# Patient Record
Sex: MALE
[De-identification: names, ages, dates, MRNs, and addresses within clinical notes are randomized per-mention and may not be internally consistent; named-entity substitution may affect disease eponyms.]

---

## 2019-02-13 ENCOUNTER — HOSPITAL ENCOUNTER (EMERGENCY)
Dept: HOSPITAL 97 - ER | Age: 34
Discharge: HOME | End: 2019-02-13
Payer: COMMERCIAL

## 2019-02-13 DIAGNOSIS — R00.0: ICD-10-CM

## 2019-02-13 DIAGNOSIS — I10: Primary | ICD-10-CM

## 2019-02-13 DIAGNOSIS — Z79.899: ICD-10-CM

## 2019-02-13 LAB
BUN BLD-MCNC: 10 MG/DL (ref 7–18)
GLUCOSE SERPLBLD-MCNC: 85 MG/DL (ref 74–106)
HCT VFR BLD CALC: 46.5 % (ref 39.6–49)
LYMPHOCYTES # SPEC AUTO: 1.1 K/UL (ref 0.7–4.9)
PMV BLD: 8.8 FL (ref 7.6–11.3)
POTASSIUM SERPL-SCNC: 4.2 MMOL/L (ref 3.5–5.1)
RBC # BLD: 5.37 M/UL (ref 4.33–5.43)
TROPONIN (EMERG DEPT USE ONLY): < 0.02 NG/ML (ref 0–0.04)
TSH SERPL DL<=0.05 MIU/L-ACNC: 1.01 UIU/ML (ref 0.36–3.74)

## 2019-02-13 PROCEDURE — 87804 INFLUENZA ASSAY W/OPTIC: CPT

## 2019-02-13 PROCEDURE — 96360 HYDRATION IV INFUSION INIT: CPT

## 2019-02-13 PROCEDURE — 80048 BASIC METABOLIC PNL TOTAL CA: CPT

## 2019-02-13 PROCEDURE — 36415 COLL VENOUS BLD VENIPUNCTURE: CPT

## 2019-02-13 PROCEDURE — 93005 ELECTROCARDIOGRAM TRACING: CPT

## 2019-02-13 PROCEDURE — 84484 ASSAY OF TROPONIN QUANT: CPT

## 2019-02-13 PROCEDURE — 85025 COMPLETE CBC W/AUTO DIFF WBC: CPT

## 2019-02-13 PROCEDURE — 82550 ASSAY OF CK (CPK): CPT

## 2019-02-13 PROCEDURE — 96361 HYDRATE IV INFUSION ADD-ON: CPT

## 2019-02-13 PROCEDURE — 71046 X-RAY EXAM CHEST 2 VIEWS: CPT

## 2019-02-13 PROCEDURE — 84443 ASSAY THYROID STIM HORMONE: CPT

## 2019-02-13 PROCEDURE — 99285 EMERGENCY DEPT VISIT HI MDM: CPT

## 2019-02-13 NOTE — ER
Nurse's Notes                                                                                     

 [facility]                                                                

Name: Feroz Mcdaniel                                                                               

Age: 34 yrs                                                                                       

Sex: Male                                                                                         

: 1985                                                                                   

MRN: [id number]                                                                                   

Arrival Date: 2019                                                                          

Time: 12:46                                                                                       

Account#: [id number]                                                                            

Bed 15                                                                                            

Private MD: None, None                                                                            

Diagnosis: Tachycardia, unspecified;Essential (primary) hypertension                              

                                                                                                  

Presentation:                                                                                     

                                                                                             

12:50 Presenting complaint: Productive cough with green sputum and headache x 1 week, N/V     hb  

      today. Pt reports home /111, . Transition of care: patient was not received     

      from another setting of care. Onset of symptoms was 2019. Risk Assessment:     

      Do you want to hurt yourself or someone else? Patient reports no desire to harm self or     

      others. Care prior to arrival: None.                                                        

12:50 Method Of Arrival: Ambulatory                                                           hb  

12:50 Acuity: YAMILETH 3                                                                           hb  

14:00 Initial Sepsis Screen: Does the patient meet any 2 criteria? No. Patient's initial      ph  

      sepsis screen is negative. Does the patient have a suspected source of infection? No.       

      Patient's initial sepsis screen is negative.                                                

                                                                                                  

Historical:                                                                                       

- Allergies:                                                                                      

12:52 No Known Allergies;                                                                     hb  

- Home Meds:                                                                                      

12:52 unkown BP med [Active];                                                                 hb  

- PMHx:                                                                                           

12:52 Hypertension;                                                                           hb  

- PSHx:                                                                                           

12:52 None;                                                                                   hb  

                                                                                                  

- Immunization history:: Adult Immunizations up to date.                                          

- Social history:: Smoking status: Patient/guardian denies using tobacco.                         

- Ebola Screening: : No symptoms or risks identified at this time.                                

                                                                                                  

                                                                                                  

Screenin:33 Abuse screen: Denies threats or abuse. Denies injuries from another. Nutritional        ph  

      screening: No deficits noted. Tuberculosis screening: No symptoms or risk factors           

      identified. Fall Risk None identified.                                                      

                                                                                                  

Assessment:                                                                                       

13:53 General: Appears in no apparent distress. comfortable, well groomed, well developed,    ph  

      well nourished, Behavior is calm, cooperative, appropriate for age, Denies fever. Pain:     

      Complains of pain in right parietal area and right side of the back of head Quality of      

      pain is described as pressure. Neuro: Oriented to.                                          

15:30 Reassessment: Patient appears in no apparent distress at this time. Patient and/or      ph  

      family updated on plan of care and expected duration. Pain level reassessed. Patient is     

      alert, oriented x 3, equal unlabored respirations, skin warm/dry/pink.                      

16:30 Reassessment: Patient appears in no apparent distress at this time. Patient and/or      ph  

      family updated on plan of care and expected duration. Pain level reassessed. Patient is     

      alert, oriented x 3, equal unlabored respirations, skin warm/dry/pink.                      

17:30 Reassessment: Patient appears in no apparent distress at this time. Patient and/or      ph  

      family updated on plan of care and expected duration. Pain level reassessed. Patient is     

      alert, oriented x 3, equal unlabored respirations, skin warm/dry/pink. Pty resting          

      quietly, denies nausea or pain, awaiting results of repeat cardiac enzymes.                 

18:34 Reassessment: Patient appears in no apparent distress at this time. Patient and/or      ph  

      family updated on plan of care and expected duration. Pain level reassessed. Patient is     

      alert, oriented x 3, equal unlabored respirations, skin warm/dry/pink. Pt states that       

      he will follow up w/ cardiologist, d/c home w/ family.                                      

                                                                                                  

Vital Signs:                                                                                      

12:52  / 96; Pulse 116; Resp 18; Temp 98.5; Pulse Ox 100% on R/A; Pain 4/10;            hb  

14:34  / 84; Pulse 91; Resp 18; Pulse Ox 98% ;                                          ph  

14:34 Pulse 130;                                                                              ph  

16:18  / 83; Pulse 103; Resp 16; Pulse Ox 99% on R/A;                                   ph  

14:34 after vomiting                                                                          ph  

                                                                                                  

ED Course:                                                                                        

12:46 Patient arrived in ED.                                                                  sb2 

12:47 None, None is Private Physician.                                                        sb2 

12:52 Triage completed.                                                                       hb  

12:52 Arm band placed on left wrist.                                                          hb  

12:53 [facility]                                                        snw 

12:53 Rikki Sorenson MD is Attending Physician.                                                snw 

13:25 X-ray completed. Patient tolerated procedure well. Patient moved to radiology via       sw  

      wheelchair. Patient moved back from radiology.                                              

13:26 Chest Pa And Lat (2 Views) XRAY In Process Unspecified.                                 EDMS

13:36 Yvonne Carson, BRENNEN is Primary Nurse.                                                    ph  

13:44 EKG done, by EKG tech. reviewed by Tana LYNN.                               dt2 

13:46 Initial lab(s) drawn, by me, sent to lab. Inserted saline lock: 20 gauge in right       [facility] 

      antecubital area, using aseptic technique. Blood collected.                                 

13:49 Flu and/or RSV swab sent to lab.                                                        [facility] 

14:00 Patient has correct armband on for positive identification. Placed in gown. Bed in low  ph  

      position. Call light in reach. Side rails up X 1. Cardiac monitor on. Pulse ox on. NIBP     

      on. Door closed. Noise minimized. Lights dimmed. Warm blanket given.                        

17:27 EKG done, by EKG tech.                                                                  dt2 

18:07 Deandre Monteiro MD is Referral Physician.                                                snw 

18:35 No provider procedures requiring assistance completed. IV discontinued, intact,         ph  

      bleeding controlled, No redness/swelling at site. Pressure dressing applied.                

                                                                                                  

Administered Medications:                                                                         

14:20 Drug: NS 0.9% 1000 ml Route: IV; Rate: 1 bolus; Site: right antecubital;                ph  

18:38 Follow up: Response: No adverse reaction; IV Status: Completed infusion                 ph  

14:20 Drug: Zofran 4 mg Route: PO;                                                            ph  

18:38 Follow up: Response: No adverse reaction                                                ph  

                                                                                                  

                                                                                                  

Outcome:                                                                                          

18:07 Discharge ordered by MD.                                                                snw 

18:37 Discharged to home ambulatory, with family.                                             ph  

18:37 Condition: good                                                                             

18:37 Discharge instructions given to patient, Instructed on discharge instructions, follow       

      up and referral plans. Demonstrated understanding of instructions, follow-up care.          

18:39 Patient left the ED.                                                                    ph  

                                                                                                  

Signatures:                                                                                       

Dispatcher [employer]                           EDMS                                                 

Tana Hansen, MONICA-C                 FNP-Csnw                                                  

Yvonne Carson RN                      BRENNEN                                                      

Macy Stafford Heather, RN RN                                                      

Eloisa Tobin                              3                                                  

Kristen Gordon                               2                                                  

Ivonne Byrne                             2                                                  

                                                                                                  

**************************************************************************************************

## 2019-02-13 NOTE — EKG
Test Date:    2019-02-13               Test Time:    13:39:42

Technician:   KANDI                                     

                                                     

MEASUREMENT RESULTS:                                       

Intervals:                                           

Rate:         83                                     

AR:           138                                    

QRSD:         92                                     

QT:           360                                    

QTc:          423                                    

Axis:                                                

P:            33                                     

AR:           138                                    

QRS:          61                                     

T:            21                                     

                                                     

INTERPRETIVE STATEMENTS:                                       

                                                     

Normal sinus rhythm with sinus arrhythmia

Normal ECG

No previous ECG available for comparison



Electronically Signed On 02-13-19 16:44:00 CST by Deandre Monteiro

## 2019-02-13 NOTE — XMS REPORT
Patient Summary Document

 Created on:2019



Patient:LANDON GUILLORY

Sex:Male

:1985

External Reference #:[id number]





Demographics







 Address  [address] 



   [place] [address]

 

 Home Phone  [phone]

 

 Work Phone  [phone]

 

 Email Address  DECL[place]ED

 

 Preferred Language  Unknown

 

 Marital Status  Unknown

 

 Hinduism Affiliation  Unknown

 

 Race  Unknown

 

 Ethnic Group  Unknown









Author







 [facility]

 

 Address  [address]



   [place] [address]

 

 Phone  [phone]









Care Team Providers







 Name  Role  Phone

 

 Unavailable  Unavailable  Unavailable









Problems

This patient has no known problems.



Allergies, Adverse Reactions, Alerts

This patient has no known allergies or adverse reactions.



Medications

This patient has no known medications.



Encounters







 Start  End  Encounter  Admission  Attending  Care  Care  Encounter



 Date/Time  Date/Time  Type  Type  Clinicians  Facility  Department  ID

 

 2018    Inpatient      [facility]  MED  [id number]



 18:50:00

## 2019-02-13 NOTE — EDPHYS
Physician Documentation                                                                           

 [facility]                                                                

Name: Feroz Mcdaniel                                                                               

Age: 34 yrs                                                                                       

Sex: Male                                                                                         

: 1985                                                                                   

MRN: [id number]                                                                                   

Arrival Date: 2019                                                                          

Time: 12:46                                                                                       

Account#: [id number]                                                                            

Bed 15                                                                                            

Private MD: None, None                                                                            

ED Physician Rikki Sorenson                                                                         

HPI:                                                                                              

                                                                                             

13:02 This 34 yrs old  Male presents to ER via Ambulatory with complaints of High     snw 

      Blood Pressure.                                                                             

13:02 The patient has elevated blood pressure and discovered this at home, with a home        snw 

      device. Onset: The symptoms/episode began/occurred x 1 week with more elevated blood        

      pressure and HR than normal. Associated signs and symptoms: Pertinent positives:            

      headache, vomiting, palpitations. Severity of symptoms: At its worst the blood pressure     

      was DBP > 100. The patient has not experienced similar symptoms in the past. It is          

      unknown whether or not the patient has recently seen a physician.                           

                                                                                                  

Historical:                                                                                       

- Allergies:                                                                                      

12:52 No Known Allergies;                                                                     hb  

- Home Meds:                                                                                      

12:52 unkown BP med [Active];                                                                 hb  

- PMHx:                                                                                           

12:52 Hypertension;                                                                           hb  

- PSHx:                                                                                           

12:52 None;                                                                                   hb  

                                                                                                  

- Immunization history:: Adult Immunizations up to date.                                          

- Social history:: Smoking status: Patient/guardian denies using tobacco.                         

- Ebola Screening: : No symptoms or risks identified at this time.                                

                                                                                                  

                                                                                                  

ROS:                                                                                              

13:01 Constitutional: Negative for fever, chills, and weight loss, Eyes: Negative for injury, snw 

      pain, redness, and discharge, ENT: Negative for injury, pain, and discharge, Neck:          

      Negative for injury, pain, and swelling.                                                    

13:01 Respiratory: Negative for shortness of breath, cough, wheezing, and pleuritic chest         

      pain.                                                                                       

13:01 Back: Negative for injury and pain, : Negative for injury, bleeding, discharge, and       

      swelling, MS/Extremity: Negative for injury and deformity, Skin: Negative for injury,       

      rash, and discoloration, Neuro: Negative for headache, weakness, numbness, tingling,        

      and seizure.                                                                                

13:01 Cardiovascular: Positive for palpitations, tachycardia, high blood pressure.                

13:01 Abdomen/GI: Positive for vomiting.                                                          

                                                                                                  

Exam:                                                                                             

13:00 Eyes:  Pupils equal round and reactive to light, extra-ocular motions intact.  Lids and snw 

      lashes normal.  Conjunctiva and sclera are non-icteric and not injected.  Cornea within     

      normal limits.  Periorbital areas with no swelling, redness, or edema. ENT:  Nares          

      patent. No nasal discharge, no septal abnormalities noted.  Tympanic membranes are          

      normal and external auditory canals are clear.  Oropharynx with no redness, swelling,       

      or masses, exudates, or evidence of obstruction, uvula midline.  Mucous membranes           

      moist. Neck:  Trachea midline, no thyromegaly or masses palpated, and no cervical           

      lymphadenopathy.  Supple, full range of motion without nuchal rigidity, or vertebral        

      point tenderness.  No Meningismus. Chest/axilla:  Normal chest wall appearance and          

      motion.  Nontender with no deformity.  No lesions are appreciated. Respiratory:  Lungs      

      have equal breath sounds bilaterally, clear to auscultation and percussion.  No rales,      

      rhonchi or wheezes noted.  No increased work of breathing, no retractions or nasal          

      flaring. Abdomen/GI:  Soft, non-tender, with normal bowel sounds.  No distension or         

      tympany.  No guarding or rebound.  No evidence of tenderness throughout. Back:  No          

      spinal tenderness.  No costovertebral tenderness.  Full range of motion. Skin:  Warm,       

      dry with normal turgor.  Normal color with no rashes, no lesions, and no evidence of        

      cellulitis. MS/ Extremity:  Pulses equal, no cyanosis.  Neurovascular intact.  Full,        

      normal range of motion. Neuro:  Awake and alert, GCS 15, oriented to person, place,         

      time, and situation.  Cranial nerves II-XII grossly intact.  Motor strength 5/5 in all      

      extremities.  Sensory grossly intact.  Cerebellar exam normal.  Normal gait.                

13:00 Constitutional: The patient appears alert, awake, uncomfortable.                            

13:00 Head/face: Noted is petechiae to upper cheeks s/p vomiting/cough.                           

13:00 Cardiovascular: Rate: tachycardic, Rhythm: regular, Heart sounds: normal.                   

                                                                                                  

Vital Signs:                                                                                      

12:52  / 96; Pulse 116; Resp 18; Temp 98.5; Pulse Ox 100% on R/A; Pain 4/10;            hb  

14:34  / 84; Pulse 91; Resp 18; Pulse Ox 98% ;                                          ph  

14:34 Pulse 130;                                                                              ph  

16:18  / 83; Pulse 103; Resp 16; Pulse Ox 99% on R/A;                                   ph  

14:34 after vomiting                                                                          ph  

                                                                                                  

MDM:                                                                                              

12:54 Patient medically screened.                                                             snw 

17:55 Data reviewed: vital signs, nurses notes. Data interpreted: Pulse oximetry: on room air snw 

      is 99 %. Interpretation: normal. Counseling: I had a detailed discussion with the           

      patient and/or guardian regarding: the historical points, exam findings, and any            

      diagnostic results supporting the discharge/admit diagnosis, lab results, radiology         

      results, the need for outpatient follow up. Special discussion: Based on the patient's      

      history, exam, and Dx evaluation, there is no indication for emergent intervention or       

      inpatient Tx. It is understood by the patient/guardian that if the Sx's persist or          

      worsen they need to return immediately for re-evaluation. Based on the history and exam     

      findings, there is no indication for further emergent testing or inpatient evaluation.      

      I discussed with the patient/guardian the need to see the cardiologist for further          

      evaluation of the symptoms. I discussed with the patient/guardian the need to see the       

      primary care provider for further evaluation of the symptoms. ED course: Encouraged to      

      f/u cardiology second to very strong family history of heart disease.                       

                                                                                                  

                                                                                             

12:54 Order name: Flu; Complete Time: 14:22                                                   snw 

                                                                                             

13:00 Order name: TSH; Complete Time: 14:28                                                   snw 

                                                                                             

13:00 Order name: CBC with Diff; Complete Time: 14:13                                         snw 

                                                                                             

13:00 Order name: Chem 7; Complete Time: 14:28                                                snw 

                                                                                             

13:00 Order name: Troponin (emerg Dept Use Only); Complete Time: 14:28                        snw 

                                                                                             

13:00 Order name: CPK; Complete Time: 14:28                                                   snw 

                                                                                             

13:00 Order name: Chest Pa And Lat (2 Views) XRAY; Complete Time: 13:42                       snw 

                                                                                             

13:01 Order name: EKG; Complete Time: 13:02                                                   snw 

                                                                                             

13:01 Order name: EKG - Nurse/Tech; Complete Time: 13:51                                      snw 

                                                                                             

14:29 Order name: Recheck Vital Signs; Complete Time: 14:41                                   snw 

                                                                                             

17:03 Order name: Troponin (emerg Dept Use Only); Complete Time: 18:09                        snw 

                                                                                             

17:03 Order name: EKG; Complete Time: 17:04                                                   snw 

                                                                                                  

Administered Medications:                                                                         

14:20 Drug: NS 0.9% 1000 ml Route: IV; Rate: 1 bolus; Site: right antecubital;                ph  

18:38 Follow up: Response: No adverse reaction; IV Status: Completed infusion                 ph  

14:20 Drug: Zofran 4 mg Route: PO;                                                            ph  

18:38 Follow up: Response: No adverse reaction                                                ph  

                                                                                                  

                                                                                                  

Disposition:                                                                                      

19:00 Co-signature as Attending Physician, Rikki Sorenson MD.                                    rn  

                                                                                                  

Disposition:                                                                                      

19 18:07 Discharged to Home. Impression: Tachycardia, unspecified, Essential (primary)      

  hypertension.                                                                                   

- Condition is Stable.                                                                            

- Discharge Instructions: Hypertension, Heart Disease Prevention, DASH Eating Plan,               

  Rehydration, Adult, Managing Your Hypertension.                                                 

                                                                                                  

- Work release form, Medication Reconciliation Form, Thank You Letter, Antibiotic                 

  Education, Prescription Opioid Use form.                                                        

- Follow up: Private Physician; When: 2 - 3 days; Reason: Recheck today's complaints,             

  Continuance of care, Re-evaluation by your physician. Follow up: Emergency                      

  Department; When: As needed; Reason: Worsening of condition. Follow up: Deandre Monteiro;           

  When: 1 - 2 days; Reason: Recheck today's complaints, Continuance of care.                      

                                                                                                  

                                                                                                  

                                                                                                  

Signatures:                                                                                       

Dispatcher [employer]                           EDMS                                                 

Tana Hansen, FNP-C                 FNP-Csnw                                                  

Rikki Sorenson MD MD rn Hall, Patricia, RN RN ph Baxter, Heather, RN RN                                                      

                                                                                                  

Corrections: (The following items were deleted from the chart)                                    

18:39 18:07 2019 18:07 Discharged to Home. Impression: Tachycardia, unspecified;        ph  

      Essential (primary) hypertension. Condition is Stable. Discharge Instructions:              

      Hypertension, Heart Disease Prevention, DASH Eating Plan, Rehydration, Adult, Managing      

      Your Hypertension. Forms are Work release form, Medication Reconciliation Form, Thank       

      You Letter, Antibiotic Education, Prescription Opioid Use. Follow up: Private               

      Physician; When: 2 - 3 days; Reason: Recheck today's complaints, Continuance of care,       

      Re-evaluation by your physician. Follow up: Emergency Department; When: As needed;          

      Reason: Worsening of condition. Follow up: Deandre Monteiro; When: 1 - 2 days; Reason:          

      Recheck today's complaints, Continuance of care. snw                                        

                                                                                                  

**************************************************************************************************

## 2019-02-13 NOTE — RAD REPORT
EXAM DESCRIPTION:  Choco Hammond (2 Views)2/13/2019 1:28 pm

 

CLINICAL HISTORY:  Cough

 

COMPARISON:  None

 

FINDINGS:   The lungs appear clear of acute infiltrate. The heart is normal size

 

IMPRESSION:   No acute abnormalities displayed

## 2019-02-13 NOTE — EKG
Test Date:    2019-02-13               Test Time:    17:16:05

Technician:   KANDI                                     

                                                     

MEASUREMENT RESULTS:                                       

Intervals:                                           

Rate:         103                                    

FL:           132                                    

QRSD:         90                                     

QT:           350                                    

QTc:          458                                    

Axis:                                                

P:            42                                     

FL:           132                                    

QRS:          65                                     

T:            10                                     

                                                     

INTERPRETIVE STATEMENTS:                                       

                                                     

Sinus tachycardia

Otherwise normal ECG

Compared to ECG 02/13/2019 13:39:42

Sinus rhythm no longer present

Sinus arrhythmia no longer present



Electronically Signed On 02-13-19 20:31:30 CST by Deandre Monteiro